# Patient Record
Sex: FEMALE | Race: WHITE
[De-identification: names, ages, dates, MRNs, and addresses within clinical notes are randomized per-mention and may not be internally consistent; named-entity substitution may affect disease eponyms.]

---

## 2017-12-14 ENCOUNTER — HOSPITAL ENCOUNTER (OUTPATIENT)
Dept: HOSPITAL 76 - DI | Age: 42
Discharge: HOME | End: 2017-12-14
Attending: FAMILY MEDICINE
Payer: COMMERCIAL

## 2017-12-14 DIAGNOSIS — R10.31: Primary | ICD-10-CM

## 2017-12-14 DIAGNOSIS — R10.11: ICD-10-CM

## 2017-12-14 PROCEDURE — 74177 CT ABD & PELVIS W/CONTRAST: CPT

## 2017-12-14 NOTE — CT PRELIMINARY REPORT
Accession: [id number]

Exam: CT ABDOMEN/PELVIS W/

 

IMPRESSION: Negative abdomen and pelvis CT. No acute solid or hollow viscus organ abnormalities to ac
count for the patient's presentation.

 

[facility]

 

SITE ID: [id number]

## 2017-12-14 NOTE — CT REPORT
EXAM:

CT ABDOMEN AND PELVIS

 

EXAM DATE: 2017 07:56 PM.

 

CLINICAL HISTORY: Abdominal pain

 

COMPARISONS: None.

 

TECHNIQUE: Routine helical CT imaging was performed through the abdomen and pelvis. IV contrast: 50 M
L ISOVUE 300. Enteric contrast: Positive. Reconstructions: Coronal and sagittal.

 

In accordance with CT protocol optimization, one or more of the following dose reduction techniques w
ere utilized for this exam: automated exposure control, adjustment of mA and/or KV based on patient s
ize, or use of iterative reconstructive technique.

 

FINDINGS: 

Lung Bases: Unremarkable.

 

Liver: Normal. No masses.

 

Gallbladder/Bile Ducts: Unremarkable.

 

Spleen: There is borderline splenomegaly.

 

Pancreas: Normal.

 

Adrenal Glands: Normal.

 

Kidneys: Normal. No masses or hydronephrosis.

 

Peritoneal Cavity/Bowel: Normal. No free fluid, free air or adenopathy. No masses or acute inflammato
ry process. The appendix is well visualized and normal.

 

Pelvic Organs: Patient has undergone . The uterus and adnexa are displaced anteriorly. The u
rinary bladder is unremarkable.

 

Vasculature: No aneurysms or other significant abnormality.

 

Bones: No significant abnormality.

 

Other: None.

 

IMPRESSION: Negative abdomen and pelvis CT. No acute solid or hollow viscus organ abnormalities to ac
count for the patient's presentation.

 

[facility]

Referring Provider Line: [phone]

 

SITE ID: [id number]

## 2017-12-18 ENCOUNTER — HOSPITAL ENCOUNTER (OUTPATIENT)
Dept: HOSPITAL 76 - DI | Age: 42
Discharge: HOME | End: 2017-12-18
Attending: FAMILY MEDICINE
Payer: COMMERCIAL

## 2017-12-18 DIAGNOSIS — Z53.9: Primary | ICD-10-CM

## 2017-12-23 ENCOUNTER — HOSPITAL ENCOUNTER (OUTPATIENT)
Dept: HOSPITAL 76 - DI | Age: 42
Discharge: HOME | End: 2017-12-23
Attending: FAMILY MEDICINE
Payer: COMMERCIAL

## 2017-12-23 DIAGNOSIS — K76.9: Primary | ICD-10-CM

## 2017-12-23 PROCEDURE — 76705 ECHO EXAM OF ABDOMEN: CPT

## 2017-12-23 NOTE — ULTRASOUND REPORT
EXAM:

ABDOMEN ULTRASOUND LIMITED, RUQ

 

EXAM DATE: 12/23/2017 03:36 PM.

 

CLINICAL HISTORY: RUQ PAIN.

 

COMPARISON: CT 12/14/2017.

 

TECHNIQUE: Real-time scanning was performed with static images obtained. 

 

FINDINGS: 

Liver: There is a cyst in the left lobe of the liver measuring 0.4 cm in diameter. The parenchyma is 
heterogeneous. There is an ill-defined echogenic lesion in the left lobe measuring 1.9 x 1.0 x 1.7 cm
. No intrahepatic biliary dilatation. 16.8 cm. Main portal vein flow: Hepatopetal.

 

Gallbladder: There is a small non-shadowing density in the gallbladder neck consistent with a non-sha
dowing stone, polyp or sludge ball. This does not appear to move. Negative ultrasound Childers sign. No
 shadowing gallstones. Gallbladder wall thickness normal at 1.8 mm. 

 

Biliary System: CBD measures 4 mm. No intrahepatic or extrahepatic ductal dilatation. 

 

Other: None. 

 

IMPRESSION: 

1. Small gallstone versus sludge ball or gallbladder wall polyp without other findings of acute verónica
cystitis. 

2. No biliary dilatation. 

3. Nonspecific echogenic lesion in the liver abutting shiva hepatis. Differential diagnosis includes 
focal fat, hemangioma, or other nonspecific mass. This finding is not clearly identified on the abdom
en CT from 12/14/2017

 

[facility]

Referring Provider Line: [phone]

 

SITE ID: [id number]

## 2017-12-23 NOTE — ULTRASOUND PRELIMINARY REPORT
Accession: [id number]

Exam: US ABDOMEN LIMITED

 

IMPRESSION: 

1. Small gallstone versus sludge ball or gallbladder wall polyp without other findings of acute verónica
cystitis. 

2. No biliary dilatation. 

3. Nonspecific echogenic lesion in the liver abutting shiva hepatis. Differential diagnosis includes 
focal fat, hemangioma, or other nonspecific mass. This finding is not clearly identified on the abdom
en CT from 12/14/2017

 

[facility]

 

SITE ID: [id number]

## 2018-01-19 ENCOUNTER — HOSPITAL ENCOUNTER (OUTPATIENT)
Dept: HOSPITAL 76 - DI | Age: 43
Discharge: HOME | End: 2018-01-19
Attending: FAMILY MEDICINE
Payer: COMMERCIAL

## 2018-01-19 DIAGNOSIS — Z12.31: Primary | ICD-10-CM

## 2018-01-19 PROCEDURE — 77067 SCR MAMMO BI INCL CAD: CPT

## 2018-01-22 NOTE — MAMMOGRAPHY REPORT
DATE OF SERVICE: 01/19/2018

 

DIGITAL SCREENING MAMMOGRAM:  01/19/2018

 

CLINICAL INDICATION:  A 42-year-old with history of benign biopsy, history of late 

childbearing, for screening.

 

COMPARISON:  10/2016, 07/2014, 01/2014, 07/2013.

 

TECHNIQUE:  Routine CC and MLO projections were obtained of the breasts.

 

FINDINGS:  The breasts demonstrate scattered fibroglandular densities bilaterally.  Biopsy 

marker in the right lower central breast is stable. No suspicious masses, clustered 

microcalcifications, or regions of architectural distortion are identified.

 

IMPRESSION:  BENIGN FINDINGS.

 

RECOMMENDATION:  Routine annual screening unless otherwise clinically indicated.

 

BIRADS CATEGORY 2 - BENIGN FINDINGS.

 

STANDARD QUALIFYING STATEMENTS:

 

1.  This examination was reviewed with the aid of Computer-Aided Detection (CAD).

2.  A negative or benign imaging report should not delay biopsy if clinically suspicious 

findings are present.  Consider surgical consultation if warranted.  More than 5% of cancers 

are not identified by imaging.

3.  Dense breasts may obscure an underlying neoplasm.

 

 

 

DD: 01/22/2018 11:49

TD: 01/22/2018 16:25

Job #: [id number]

## 2018-02-01 ENCOUNTER — HOSPITAL ENCOUNTER (OUTPATIENT)
Dept: HOSPITAL 76 - LAB.WCP | Age: 43
Discharge: HOME | End: 2018-02-01
Attending: FAMILY MEDICINE
Payer: COMMERCIAL

## 2018-02-01 DIAGNOSIS — R31.9: Primary | ICD-10-CM

## 2018-02-01 PROCEDURE — 87086 URINE CULTURE/COLONY COUNT: CPT

## 2018-02-02 ENCOUNTER — HOSPITAL ENCOUNTER (OUTPATIENT)
Dept: HOSPITAL 76 - SDS | Age: 43
Discharge: HOME | End: 2018-02-02
Attending: SURGERY
Payer: COMMERCIAL

## 2018-02-02 VITALS — SYSTOLIC BLOOD PRESSURE: 125 MMHG | DIASTOLIC BLOOD PRESSURE: 80 MMHG

## 2018-02-02 DIAGNOSIS — K21.9: ICD-10-CM

## 2018-02-02 DIAGNOSIS — K80.10: Primary | ICD-10-CM

## 2018-02-02 DIAGNOSIS — I10: ICD-10-CM

## 2018-02-02 DIAGNOSIS — J45.909: ICD-10-CM

## 2018-02-02 LAB
HCG UR QL: NEGATIVE
SP GR UR STRIP.AUTO: 1.02 (ref 1–1.03)

## 2018-02-02 PROCEDURE — 47563 LAPARO CHOLECYSTECTOMY/GRAPH: CPT

## 2018-02-02 PROCEDURE — BF11YZZ FLUOROSCOPY OF BILIARY AND PANCREATIC DUCTS USING OTHER CONTRAST: ICD-10-PCS | Performed by: SURGERY

## 2018-02-02 PROCEDURE — 81025 URINE PREGNANCY TEST: CPT

## 2018-02-02 PROCEDURE — 0FT44ZZ RESECTION OF GALLBLADDER, PERCUTANEOUS ENDOSCOPIC APPROACH: ICD-10-PCS | Performed by: SURGERY

## 2018-02-02 PROCEDURE — 74300 X-RAY BILE DUCTS/PANCREAS: CPT

## 2018-02-02 NOTE — PROCEDURE REPORT
DATE OF SERVICE: 02/02/2018

Physician: Michael Thorne MD

 

PREOPERATIVE DIAGNOSIS:  Symptomatic cholecystitis with cholelithiasis.

 

POSTOPERATIVE DIAGNOSIS:  Symptomatic cholecystitis with cholelithiasis.

 

PROCEDURE PERFORMED:  Laparoscopic cholecystectomy with intraoperative cholangiogram.

 

OPERATING SURGEON:  Dr. Thorne.

 

ANESTHESIA:  General.

 

INDICATIONS FOR PROCEDURE:  The patient is a 42-year-old female who has been having 

intermittent right upper quadrant abdominal pain.  She had an abdominal ultrasound, which 

revealed a stone versus polyp versus sludge in the neck of the gallbladder.  Pain is in right 

upper quadrant, radiating to her back, consistent with chronic cholecystitis.

 

FINDINGS AT SURGERY:  The patient had a chronically inflamed gallbladder.  She had a normal 

intraoperative cholangiogram.

 

PROCEDURE:  After informed consent was obtained, the patient was taken to the operating room, 

placed in supine position.  General endotracheal anesthesia was administered.  The patient's 

abdomen was then prepped and draped in the usual sterile fashion.  An infraumbilical incision 

was made in the skin using a scalpel.  Prior to making abdominal incision, the skin was 

injected with local anesthesia.  A 5 mm Optiview trocar was then inserted through the incision 

through the fascia and into the abdominal cavity under direct vision.  The abdomen was then 

insufflated.  Three 5 mm ports were then placed in right upper quadrant with the 5 mm port at 

the umbilicus switched to a 12 mm port.  The gallbladder fundus was then grasped and lifted 

anteriorly and superiorly exposing the triangle of Calot.  The peritoneum was then scored in 

this area with the cystic duct and cystic artery then being identified.  Cystic artery was 

isolated, clipped proximally and distally and then divided.  Critical view had been obtained. A

clip was then placed across the cystic duct/gallbladder junction with the cystic duct, then 

being incised.  Cholangiogram catheter was then inserted and cholangiogram was performed.  This

showed normal biliary anatomy without any filling defects being present.  Catheter was then 

withdrawn and clips were then placed across the distal cystic duct with it then being divided. 

Gallbladder was then dissected off the gallbladder bed, placed in an Endobag and removed 

through the umbilical port.  Right upper quadrant was thoroughly irrigated until the return 

fluid was clear.  There was no bleeding noted from the gallbladder bed.  The ports were then 

removed and no bleeding was noted at the port sites with the abdomen then being desufflated.  

The umbilical fascial defect was closed using 0 Vicryl suture.  Skin incisions were closed 

using 4-0 Monocryl subcuticular stitch.  Dermabond was then placed upon the incision site.  The

patient was then awakened, extubated, and taken from the operating room in stable condition.

 

ESTIMATED BLOOD LOSS:  Less than 5 mL

 

COMPLICATIONS:  None.

 

CONDITION OF THE PATIENT IN PROCEDURE:  Stable.

 

SPECIMENS:  Gallbladder and its contents.

 

DRAINS AND PACKS:  None.

 

CLASSIFICATION OF WOUND:  Clean/contaminated.

 

 

DD: 02/02/2018 10:38

TD: 02/02/2018 10:58

Job #: [id number]

## 2018-02-02 NOTE — XRAY REPORT
DATE OF SERVICE: 02/02/2018

 

INTRAOPERATIVE CHOLANGIOGRAM:  02/02/2018

 

CLINICAL INDICATION:  Cholecystectomy.

FINDINGS:  Two images of an intraoperative cholangiogram demonstrate 
opacification of the 

common bile duct and proximal intrahepatic ducts.  Contrast spills freely into 
the duodenum.  No

definite choledocholithiasis is identified.

 

20 seconds of fluoroscopy time was provided to Dr. Thorne; 2 spot images 
obtained.

 

IMPRESSION:  INTRAOPERATIVE IMAGING OF CHOLANGIOGRAM.

 

 

 

DD: 02/02/2018 07:53

TD: 02/02/2018 10:30

Job #: [id number]

[email or web address]

## 2018-09-10 ENCOUNTER — HOSPITAL ENCOUNTER (OUTPATIENT)
Dept: HOSPITAL 76 - DI | Age: 43
Discharge: HOME | End: 2018-09-10
Attending: FAMILY MEDICINE
Payer: COMMERCIAL

## 2018-09-10 DIAGNOSIS — R00.2: ICD-10-CM

## 2018-09-10 DIAGNOSIS — R06.00: Primary | ICD-10-CM

## 2018-09-10 PROCEDURE — 71275 CT ANGIOGRAPHY CHEST: CPT

## 2018-09-10 NOTE — CT REPORT
Reason:  DYSPNEA,PALPITATIONS

Procedure Date:  09/10/2018   

Accession Number:  [id number] / [id number]                    

Procedure:  CT  - Chest Angio (PE) CPT Code:  

 

FULL RESULT:

 

 

EXAM:

CT ANGIOGRAM CHEST

 

EXAM DATE: 9/10/2018 02:21 PM.

 

CLINICAL HISTORY: DYSPNEA,PALPITATIONS.

 

COMPARISON: None.

 

TECHNIQUE: Routine helical imaging was performed through the chest in the 

pulmonary arterial phase. IV Contrast: 80 cc Isovue-300. Reconstructions: 

Coronal 3-D MIP reconstructions.Sagittal and coronal.

 

In accordance with CT protocol optimization, one or more of the following 

dose reduction techniques were utilized for this exam: automated exposure 

control, adjustment of mA and/or KV based on patient size, or use of 

iterative reconstructive technique.

 

FINDINGS:

Pulmonary Arteries:

Diagnostic quality: Adequate through the segmental arteries. No evidence 

for acute or chronic pulmonary emboli.

 

RV/LV is within normal limits. There is no interventricular septal 

bowing. There is no reflux of contrast material in the IVC.

 

Lungs/Pleura: There is minimal dependent atelectasis. No consolidation, 

nodules, or edema. No effusions or pneumothorax.

 

Mediastinum: Normal. No cardiac enlargement or adenopathy.

 

Thoracic Aorta: Unremarkable.

 

Upper Abdomen: There is a slightly bulbous appearance of the mid to 

inferior anterior left kidney, not well evaluated due to the early phase 

of contrast. It is difficult to exclude a mass.

 

Cholecystectomy is noted.

 

No bone lesions are seen.

IMPRESSION: No evidence of pulmonary embolism or acute aortic syndrome.

 

No other thoracic findings are seen to suggest a cause of the patient's 

symptoms.

 

There is an asymmetric somewhat bulbous appearance of the mid to inferior 

anterior left kidney, making it difficult to exclude a mass (series 5 

images 150 and 151). This is incompletely evaluated in this early phase 

of contrast. Recommend additional imaging which could include ultrasound, 

or dedicated renal CT versus MRI.

 

[facility]

## 2018-09-20 ENCOUNTER — HOSPITAL ENCOUNTER (OUTPATIENT)
Dept: HOSPITAL 76 - DI | Age: 43
Discharge: HOME | End: 2018-09-20
Attending: FAMILY MEDICINE
Payer: COMMERCIAL

## 2018-09-20 DIAGNOSIS — R93.422: Primary | ICD-10-CM

## 2018-09-20 PROCEDURE — 74178 CT ABD&PLV WO CNTR FLWD CNTR: CPT

## 2018-09-20 NOTE — CT REPORT
Reason:  ABNORMAL RADIOLOGIC FINDINGS ON LEFT KIDNEY

Procedure Date:  09/20/2018   

Accession Number:  [id number] / [id number]                    

Procedure:  CT  - Abdomen/Pelvis W/WO CPT Code:  

 

FULL RESULT:

 

 

EXAM:

CT ABDOMEN AND PELVIS WITHOUT AND WITH CONTRAST

 

EXAM DATE: 9/20/2018 11:30 AM.

 

HISTORY: ABNORMAL RADIOLOGIC FINDINGS ON LEFT KIDNEY.

 

COMPARISON: 09/10/2018, 12/14/2017.

 

TECHNIQUE: Routine helical CT imaging was performed through the abdomen 

and pelvis before and after administration of IV contrast: ISOVUE 300  

100mL. Enteric contrast: No. Reconstruction: Coronal and sagittal.

 

In accordance with CT protocol optimization, one or more of the following 

dose reduction techniques were utilized for this exam: automated exposure 

control, adjustment of mA and/or KV based on patient size, or use of 

iterative reconstructive technique.

 

FINDINGS:

Lung Bases: Unremarkable.

 

Liver: Normal.

 

Gallbladder/Bile Ducts: Cholecystectomy.

 

Spleen: Borderline enlarged.

 

Pancreas: Normal.

 

Adrenal Glands: Normal.

 

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 No mass. 

There is a mid cortical bar with mild lobulation in the left kidney 

accounting for the bulbous appearance. No hydronephrosis or stone. The 

right kidney is normal.

 

Bladder: Unremarkable. No wall thickening or mass. No evidence of a 

ureterocele.

 

Uterus and ovaries: Unremarkable.

 

Peritoneal Cavity/Bowel: Normal caliber bowel. No free fluid, free air or 

adenopathy.

 

Vasculature: No aneurysms or other significant abnormality.

 

Bones: No significant abnormality.

IMPRESSION:

No renal mass. 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 

cortical bar and mild lobulation.

 

[facility]

## 2018-09-24 ENCOUNTER — HOSPITAL ENCOUNTER (EMERGENCY)
Dept: HOSPITAL 76 - ED | Age: 43
Discharge: HOME | End: 2018-09-24
Payer: COMMERCIAL

## 2018-09-24 VITALS — SYSTOLIC BLOOD PRESSURE: 112 MMHG | DIASTOLIC BLOOD PRESSURE: 67 MMHG

## 2018-09-24 DIAGNOSIS — J45.21: Primary | ICD-10-CM

## 2018-09-24 LAB
ALBUMIN DIAFP-MCNC: 4.5 G/DL (ref 3.2–5.5)
ALBUMIN/GLOB SERPL: 1.5 {RATIO} (ref 1–2.2)
ALP SERPL-CCNC: 42 IU/L (ref 42–121)
ALT SERPL W P-5'-P-CCNC: 21 IU/L (ref 10–60)
ANION GAP SERPL CALCULATED.4IONS-SCNC: 10 MMOL/L (ref 6–13)
AST SERPL W P-5'-P-CCNC: 22 IU/L (ref 10–42)
BASOPHILS NFR BLD AUTO: 0.1 10^3/UL (ref 0–0.1)
BASOPHILS NFR BLD AUTO: 1.4 %
BILIRUB BLD-MCNC: 1.2 MG/DL (ref 0.2–1)
BUN SERPL-MCNC: 13 MG/DL (ref 6–20)
CALCIUM UR-MCNC: 9.2 MG/DL (ref 8.5–10.3)
CHLORIDE SERPL-SCNC: 100 MMOL/L (ref 101–111)
CO2 SERPL-SCNC: 26 MMOL/L (ref 21–32)
CREAT SERPLBLD-SCNC: 0.7 MG/DL (ref 0.4–1)
EOSINOPHIL # BLD AUTO: 0.3 10^3/UL (ref 0–0.7)
EOSINOPHIL NFR BLD AUTO: 4.7 %
ERYTHROCYTE [DISTWIDTH] IN BLOOD BY AUTOMATED COUNT: 13.9 % (ref 12–15)
GFRSERPLBLD MDRD-ARVRAT: 90 ML/MIN/{1.73_M2} (ref 89–?)
GLOBULIN SER-MCNC: 3.1 G/DL (ref 2.1–4.2)
GLUCOSE SERPL-MCNC: 102 MG/DL (ref 70–100)
HGB UR QL STRIP: 15.2 G/DL (ref 12–16)
LIPASE SERPL-CCNC: 37 U/L (ref 22–51)
LYMPHOCYTES # SPEC AUTO: 1.9 10^3/UL (ref 1.5–3.5)
LYMPHOCYTES NFR BLD AUTO: 25.1 %
MCH RBC QN AUTO: 32.2 PG (ref 27–31)
MCHC RBC AUTO-ENTMCNC: 36.3 G/DL (ref 32–36)
MCV RBC AUTO: 88.8 FL (ref 81–99)
MONOCYTES # BLD AUTO: 0.7 10^3/UL (ref 0–1)
MONOCYTES NFR BLD AUTO: 9.3 %
NEUTROPHILS # BLD AUTO: 4.4 10^3/UL (ref 1.5–6.6)
NEUTROPHILS # SNV AUTO: 7.4 X10^3/UL (ref 4.8–10.8)
NEUTROPHILS NFR BLD AUTO: 59.5 %
PDW BLD AUTO: 9.9 FL (ref 7.9–10.8)
PLATELET # BLD: 271 10^3/UL (ref 130–450)
PROT SPEC-MCNC: 7.6 G/DL (ref 6.7–8.2)
RBC MAR: 4.73 10^6/UL (ref 4.2–5.4)
SODIUM SERPLBLD-SCNC: 136 MMOL/L (ref 135–145)

## 2018-09-24 PROCEDURE — 93005 ELECTROCARDIOGRAM TRACING: CPT

## 2018-09-24 PROCEDURE — 36415 COLL VENOUS BLD VENIPUNCTURE: CPT

## 2018-09-24 PROCEDURE — 84484 ASSAY OF TROPONIN QUANT: CPT

## 2018-09-24 PROCEDURE — 96374 THER/PROPH/DIAG INJ IV PUSH: CPT

## 2018-09-24 PROCEDURE — 85025 COMPLETE CBC W/AUTO DIFF WBC: CPT

## 2018-09-24 PROCEDURE — 71046 X-RAY EXAM CHEST 2 VIEWS: CPT

## 2018-09-24 PROCEDURE — 99283 EMERGENCY DEPT VISIT LOW MDM: CPT

## 2018-09-24 PROCEDURE — 83690 ASSAY OF LIPASE: CPT

## 2018-09-24 PROCEDURE — 80053 COMPREHEN METABOLIC PANEL: CPT

## 2018-09-24 NOTE — ED PHYSICIAN DOCUMENTATION
PD HPI CHEST PAIN





- Stated complaint


Stated Complaint: CHEST PX/TINGLING ON HAND





- Chief complaint


Chief Complaint: General





- History obtained from


History obtained from: Patient, Family





- History of Present Illness


Timing - onset: How many weeks ago (1)


Timing - onset during: Rest


Timing - duration: Weeks (1)


Timing - details: Gradual onset, Still present, Waxing and waning


Pain level max: 4


Pain level now: 4


Quality: Pressure, Tightness


Location: Substernal


Radiation: Neck, Left upper extremity


Worsened by: Exertion, Palpation, Position


Associated symptoms: Shortness of air


Similar symptoms before: No diagnosis


Recently seen: Clinic





- Additional information


Additional information: 


43-year-old female with history of asthma has developed some shortness of breath

about 2 weeks ago and with that she took her inhaler began to feel some 

palpitations in her chest and went into see her doctor.  She had a positive d-

dimer and had a CT scan pulmonary angiogram done which was negative for 

pulmonary embolism but did show a mass on her left kidney.  She has had a 

subsequent CT scan of her abdomen pelvis which demonstrated a duplication of the

left collecting system.





This morning she developed some substernal chest pain that is radiated up into 

her neck and into her left arm and this is associated with some shortness of 

breath.  She is producing some phlegm with her cough.








Review of Systems


Constitutional: denies: Fever, Chills, Myalgias


Eyes: denies: Decreased vision


Ears: denies: Ear pain


Nose: reports: Rhinorrhea / runny nose, Congestion


Throat: reports: Sore throat


Cardiac: reports: Chest pain / pressure.  denies: Palpitations, Pedal edema, 

Calf pain


Respiratory: reports: Dyspnea, Cough


GI: denies: Abdominal Pain, Nausea, Vomiting


: denies: Dysuria, Frequency





PD PAST MEDICAL HISTORY





- Past Medical History


Cardiovascular: High cholesterol, Murmur


Respiratory: Asthma


Endocrine/Autoimmune: None


GI: None


: None


HEENT: None


Psych: None


Musculoskeletal: None


Derm: None





- Past Surgical History


Ortho: Other


/GYN:  section





- Present Medications


Home Medications: 


                                Ambulatory Orders











 Medication  Instructions  Recorded  Confirmed


 


Albuterol Sulfate [Proair Hfa 1 - 2 puffs INH Q4H PRN 18





Inhaler]   


 


Ascorbic Acid [Vitamin C] 1,000 mg PO DAILY 18


 


Cetirizine [ZyrTEC] 10 mg PO DAILY 18


 


Cholecalciferol (Vitamin D3) 4,000 unit PO DAILY 18





[Vitamin D3]   


 


Famotidine [Pepcid] 20 mg PO ONCE 18


 


Albuterol Sulf [Ventolin Hfa 1 - 2 puffs INH Q4HR PRN #1 inhaler 18 





Inhaler]   


 


Azithromycin [Zithromax] 250 mg PO DAILY #6 tablet 18 














- Allergies


Allergies/Adverse Reactions: 


                                    Allergies











Allergy/AdvReac Type Severity Reaction Status Date / Time


 


No Known Drug Allergies Allergy   Verified 18 12:19














PD ED PE NORMAL





- Vitals


Vital signs reviewed: Yes (normal )





- General


General: Alert and oriented X 3, No acute distress, Well developed/nourished





- HEENT


HEENT: Atraumatic, PERRL, EOMI, Moist mucous membranes, Pharynx benign, 

Dentition benign, Other (right TM is inflamed with intact landmarks. The left is

 clear. )





- Neck


Neck: Supple, no meningeal sign, No bony TTP





- Cardiac


Cardiac: RRR, No murmur





- Respiratory


Respiratory: No respiratory distress, Other (diminished breath sounds 

bilaterally with chest wall tenderness to the sternum. )





- Abdomen


Abdomen: Soft, Non tender





- Back


Back: No CVA TTP, No spinal TTP





- Derm


Derm: Normal color, Warm and dry, No rash





- Extremities


Extremities: No deformity, No edema





- Neuro


Neuro: Alert and oriented X 3, CNs 2-12 intact, No motor deficit, No sensory 

deficit, Normal speech


Eye Opening: Spontaneous


Motor: Obeys Commands


Verbal: Oriented


GCS Score: 15





- Psych


Psych: Normal mood, Normal affect





Results





- Vitals


Vitals: 


                               Vital Signs - 24 hr











  18





  09:16 09:30 10:00


 


Heart Rate 70 68 64


 


Respiratory 18 10 L 11 L





Rate   


 


Blood Pressure 135/79 H  118/66


 


O2 Saturation 100 100 100














  18





  12:28


 


Heart Rate 67


 


Respiratory 12





Rate 


 


Blood Pressure 112/67


 


O2 Saturation 99








                                     Oxygen











O2 Source                      Room air

















- EKG (time done)


  ** 0917


Rate: Rate (enter#) (70)


Rhythm: NSR


Compare to prior EKG: Old EKG unavailable


Computer interpretation: Agree with computer





- Labs


Labs: 


                                Laboratory Tests











  18





  09:27 09:27 09:27


 


WBC  7.4  


 


RBC  4.73  


 


Hgb  15.2  


 


Hct  42.0  


 


MCV  88.8  


 


MCH  32.2 H  


 


MCHC  36.3 H  


 


RDW  13.9  


 


Plt Count  271  


 


MPV  9.9  


 


Neut # (Auto)  4.4  


 


Lymph # (Auto)  1.9  


 


Mono # (Auto)  0.7  


 


Eos # (Auto)  0.3  


 


Baso # (Auto)  0.1  


 


Absolute Nucleated RBC  0.00  


 


Nucleated RBC %  0.0  


 


Sodium   136 


 


Potassium   4.0 


 


Chloride   100 L 


 


Carbon Dioxide   26 


 


Anion Gap   10.0 


 


BUN   13 


 


Creatinine   0.7 


 


Estimated GFR (MDRD)   90 


 


Glucose   102 H 


 


Calcium   9.2 


 


Total Bilirubin   1.2 H 


 


AST   22 


 


ALT   21 


 


Alkaline Phosphatase   42 


 


Troponin I    < 0.04


 


Total Protein   7.6 


 


Albumin   4.5 


 


Globulin   3.1 


 


Albumin/Globulin Ratio   1.5 


 


Lipase   37 














- Rads (name of study)


  ** 2 veiw chest


Radiology: Prelim report reviewed (Impression: No focal lung consolidation or 

pleural effusions.), EMP read indepedently, See rad report





PD MEDICAL DECISION MAKING





- ED course


Complexity details: reviewed results, re-evaluated patient, considered 

differential, d/w patient, d/w family


ED course: 





43-year-old female with a history of asthma has had an increase in her symptoms 

about 2 weeks ago and started using her inhaler. When she developed side effects

 of the inhaler she sought  and with an elevated D-dimer she had CT 

angio. There is no pathology in the chest on this study and there is again no 

pathology in the chest today. She has OM on exam and she has some improvement in

 her dyspena with the decadron alone without a duoneb treatment. She is treated 

as asthmatic bronchitis with OM as the trigger and the OM is treated. 





- Sepsis Event


Vital Signs: 


                               Vital Signs - 24 hr











  18





  09:16 09:30 10:00


 


Heart Rate 70 68 64


 


Respiratory 18 10 L 11 L





Rate   


 


Blood Pressure 135/79 H  118/66


 


O2 Saturation 100 100 100














  18





  12:28


 


Heart Rate 67


 


Respiratory 12





Rate 


 


Blood Pressure 112/67


 


O2 Saturation 99








                                     Oxygen











O2 Source                      Room air

















Departure





- Departure


Disposition:  Home, Self Care


Clinical Impression: 


Asthmatic bronchitis with acute exacerbation


Qualifiers:


 Asthma severity: mild Asthma persistence: intermittent Qualified Code(s): 

J45.21 - Mild intermittent asthma with (acute) exacerbation





Condition: Stable


Instructions:  ED Bronchitis Asthmatic, ED Otitis Media Acute Adult


Follow-Up: 


Scarlett Zamorano MD [Primary Care Provider] - 


Prescriptions: 


Albuterol Sulf [Ventolin Hfa Inhaler] 1 - 2 puffs INH Q4HR PRN #1 inhaler


 PRN Reason: Shortness Of Air/Wheezing


Azithromycin [Zithromax] 250 mg PO DAILY #6 tablet

## 2019-06-11 ENCOUNTER — HOSPITAL ENCOUNTER (OUTPATIENT)
Dept: HOSPITAL 76 - LAB.WCP | Age: 44
Discharge: HOME | End: 2019-06-11
Attending: FAMILY MEDICINE
Payer: COMMERCIAL

## 2019-06-11 DIAGNOSIS — R60.0: ICD-10-CM

## 2019-06-11 DIAGNOSIS — R10.11: Primary | ICD-10-CM

## 2019-06-11 LAB
ALBUMIN DIAFP-MCNC: 4.1 G/DL (ref 3.2–5.5)
ALBUMIN/GLOB SERPL: 1.5 {RATIO} (ref 1–2.2)
ALP SERPL-CCNC: 34 IU/L (ref 42–121)
ALT SERPL W P-5'-P-CCNC: 16 IU/L (ref 10–60)
AMYLASE SERPL-CCNC: 50 U/L (ref 28–100)
ANION GAP SERPL CALCULATED.4IONS-SCNC: 10 MMOL/L (ref 6–13)
AST SERPL W P-5'-P-CCNC: 19 IU/L (ref 10–42)
BASOPHILS NFR BLD AUTO: 0.1 10^3/UL (ref 0–0.1)
BASOPHILS NFR BLD AUTO: 1.1 %
BILIRUB BLD-MCNC: 1.3 MG/DL (ref 0.2–1)
BUN SERPL-MCNC: 10 MG/DL (ref 6–20)
CALCIUM UR-MCNC: 9.3 MG/DL (ref 8.5–10.3)
CHLORIDE SERPL-SCNC: 104 MMOL/L (ref 101–111)
CO2 SERPL-SCNC: 24 MMOL/L (ref 21–32)
CREAT SERPLBLD-SCNC: 0.7 MG/DL (ref 0.4–1)
EOSINOPHIL # BLD AUTO: 0.2 10^3/UL (ref 0–0.7)
EOSINOPHIL NFR BLD AUTO: 3.1 %
ERYTHROCYTE [DISTWIDTH] IN BLOOD BY AUTOMATED COUNT: 14 % (ref 12–15)
GFRSERPLBLD MDRD-ARVRAT: 90 ML/MIN/{1.73_M2} (ref 89–?)
GLOBULIN SER-MCNC: 2.8 G/DL (ref 2.1–4.2)
GLUCOSE SERPL-MCNC: 107 MG/DL (ref 70–100)
HGB UR QL STRIP: 14.2 G/DL (ref 12–16)
LIPASE SERPL-CCNC: 34 U/L (ref 22–51)
LYMPHOCYTES # SPEC AUTO: 2 10^3/UL (ref 1.5–3.5)
LYMPHOCYTES NFR BLD AUTO: 31.2 %
MCH RBC QN AUTO: 32 PG (ref 27–31)
MCHC RBC AUTO-ENTMCNC: 35 G/DL (ref 32–36)
MCV RBC AUTO: 91.6 FL (ref 81–99)
MONOCYTES # BLD AUTO: 0.5 10^3/UL (ref 0–1)
MONOCYTES NFR BLD AUTO: 8.1 %
NEUTROPHILS # BLD AUTO: 3.6 10^3/UL (ref 1.5–6.6)
NEUTROPHILS # SNV AUTO: 6.3 X10^3/UL (ref 4.8–10.8)
NEUTROPHILS NFR BLD AUTO: 56.5 %
PDW BLD AUTO: 10.3 FL (ref 7.9–10.8)
PLAT MORPH BLD: (no result)
PLATELET # BLD: 289 10^3/UL (ref 130–450)
PLATELET BLD QL SMEAR: (no result)
PROT SPEC-MCNC: 6.9 G/DL (ref 6.7–8.2)
RBC MAR: 4.44 10^6/UL (ref 4.2–5.4)
RBC MORPH BLD: (no result)
SODIUM SERPLBLD-SCNC: 138 MMOL/L (ref 135–145)

## 2019-06-11 PROCEDURE — 36415 COLL VENOUS BLD VENIPUNCTURE: CPT

## 2019-06-11 PROCEDURE — 82150 ASSAY OF AMYLASE: CPT

## 2019-06-11 PROCEDURE — 80053 COMPREHEN METABOLIC PANEL: CPT

## 2019-06-11 PROCEDURE — 83690 ASSAY OF LIPASE: CPT

## 2019-06-11 PROCEDURE — 85025 COMPLETE CBC W/AUTO DIFF WBC: CPT

## 2019-06-17 ENCOUNTER — HOSPITAL ENCOUNTER (OUTPATIENT)
Dept: HOSPITAL 76 - DI | Age: 44
Discharge: HOME | End: 2019-06-17
Attending: FAMILY MEDICINE
Payer: COMMERCIAL

## 2019-06-17 DIAGNOSIS — R10.11: ICD-10-CM

## 2019-06-17 DIAGNOSIS — R93.5: Primary | ICD-10-CM

## 2019-06-17 PROCEDURE — 76700 US EXAM ABDOM COMPLETE: CPT

## 2019-06-17 NOTE — ULTRASOUND REPORT
Reason:  ABDOMINAL PAIN,RIGHT UPPER QUADRANT

Procedure Date:  06/17/2019   

Accession Number:  [id number] / [id number]                    

Procedure:  US  - Abdomen Complete CPT Code:  

 

FULL RESULT:

 

 

EXAM:

ABDOMEN ULTRASOUND

 

EXAM DATE: 6/17/2019 10:31 AM.

 

CLINICAL HISTORY: Abdominal pain, right upper quadrant.

 

COMPARISON: ABDOMEN/PELVIS W/WO 09/20/2018 11:08 AM.

 

TECHNIQUE: Real-time scanning was performed with static images obtained.

 

FINDINGS:

Liver: Prominent in size with coarsened echotexture and mildly increased 

echogenicity. This decreases sensitivity for underlying masses. Within 

this setting, a geographically hyperechoic-appearing 2.6 x 1.8 x 2.3 cm 

focus at the region of the shiva hepatis is felt to represent variant 

appearance of the normal connective tissue residing in this area possibly 

in the setting of focal fatty infiltration. Otherwise, no suspicious mass 

is detected. The liver measures at least 20.5 cm. Main portal vein flow: 

Hepatopetal.

 

Gallbladder: Surgically absent.

 

Biliary System: Common bile duct measures 6 mm. No intrahepatic or 

extrahepatic ductal dilatation.

 

Pancreas: Visualized portion is unremarkable.

 

Kidneys:

Right: 10.5 cm longitudinally. Normal. No contour-deforming mass, stones, 

or hydronephrosis.

Left: 13.6 cm longitudinally. No hydronephrosis is seen in either of the 

duplicated collecting systems. No calculi are detected.

 

Spleen: 14 cm. Splenomegaly.

 

Aorta and Inferior Vena Cava: Unremarkable.

 

Other: None.

IMPRESSION: Abnormal parenchymal echogenicity of the liver, can be seen 

with hepatic steatosis.

 

Splenomegaly.

 

Echogenic region at the shiva hepatis of the liver is felt to represent a 

combination of the normal connective tissue in this region and possibly 

focal fatty infiltration, not characterized fully on this study. No 

concerning findings are made on the multiphase CT of the abdomen in 

September 2018 in this region.

 

Recommendation: Given the abnormal appearance of the liver and 

splenomegaly, correlation to risk factors for cirrhosis/HCC and 

laboratory values should be used to determine whether advanced imaging 

characterization of the liver is warranted. Alternatively, attention on 

follow-up imaging.

 

[facility]

## 2019-06-18 ENCOUNTER — HOSPITAL ENCOUNTER (OUTPATIENT)
Dept: HOSPITAL 76 - DI | Age: 44
Discharge: HOME | End: 2019-06-18
Attending: FAMILY MEDICINE
Payer: COMMERCIAL

## 2019-06-18 DIAGNOSIS — R00.2: ICD-10-CM

## 2019-06-18 DIAGNOSIS — R06.01: Primary | ICD-10-CM

## 2019-06-18 PROCEDURE — 93306 TTE W/DOPPLER COMPLETE: CPT

## 2019-06-21 ENCOUNTER — HOSPITAL ENCOUNTER (OUTPATIENT)
Dept: HOSPITAL 76 - LAB.WCP | Age: 44
Discharge: HOME | End: 2019-06-21
Attending: FAMILY MEDICINE
Payer: COMMERCIAL

## 2019-06-21 DIAGNOSIS — R10.11: Primary | ICD-10-CM

## 2019-06-21 PROCEDURE — 86709 HEPATITIS A IGM ANTIBODY: CPT

## 2019-06-21 PROCEDURE — 86803 HEPATITIS C AB TEST: CPT

## 2019-06-21 PROCEDURE — 87340 HEPATITIS B SURFACE AG IA: CPT

## 2019-06-21 PROCEDURE — 36415 COLL VENOUS BLD VENIPUNCTURE: CPT

## 2019-06-21 PROCEDURE — 86317 IMMUNOASSAY INFECTIOUS AGENT: CPT

## 2019-06-21 PROCEDURE — 86704 HEP B CORE ANTIBODY TOTAL: CPT

## 2019-06-22 LAB
HEPATITIS B SURFACE ANTIGEN: (no result)
HEPATITIS C ANTIBODY: (no result)
SIGNAL TO CUT-OFF: 0 (ref ?–1)